# Patient Record
Sex: MALE | Race: WHITE
[De-identification: names, ages, dates, MRNs, and addresses within clinical notes are randomized per-mention and may not be internally consistent; named-entity substitution may affect disease eponyms.]

---

## 2018-04-20 ENCOUNTER — HOSPITAL ENCOUNTER (EMERGENCY)
Dept: HOSPITAL 45 - C.EDB | Age: 18
Discharge: HOME | End: 2018-04-20
Payer: COMMERCIAL

## 2018-04-20 VITALS — SYSTOLIC BLOOD PRESSURE: 130 MMHG | HEART RATE: 75 BPM | OXYGEN SATURATION: 100 % | DIASTOLIC BLOOD PRESSURE: 74 MMHG

## 2018-04-20 VITALS
WEIGHT: 190.48 LBS | HEIGHT: 70.51 IN | BODY MASS INDEX: 26.97 KG/M2 | WEIGHT: 190.48 LBS | BODY MASS INDEX: 26.97 KG/M2 | HEIGHT: 70.51 IN

## 2018-04-20 VITALS — TEMPERATURE: 97.88 F
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
## 2018-04-20 DIAGNOSIS — S01.81XA: Primary | ICD-10-CM

## 2018-04-21 NOTE — EMERGENCY ROOM VISIT NOTE
ED Visit Note


First contact with patient:  00:33


CHIEF COMPLAINT: Eyebrow laceration





HISTORY OF PRESENT ILLNESS: This 17-year-old male patient presents to the 

emergency department after cutting the left eyebrow after being struck by a 

hockey stick about 30 minutes ago. The bleeding has not stopped. Denies 

weakness or numbness of the face and jaw.  No visual changes or pain of the eye 

itself. The patient rates the pain as dull and 2/10. The patient denies any 

other injuries. The patient's Tetanus shot is reportedly up to date.





REVIEW OF SYSTEMS: A 6 system review of systems was completed with positives 

and pertinent negatives listed in the HPI. 





ALLERGIES: NKDA





MEDICATIONS: No chronic medication





PMH: Otherwise healthy 





SOCIAL HISTORY: Student who lives locally





PHYSICAL EXAM: Vital Signs: Reviewed Nurse's notes, vital signs stable. GENERAL

:  White male, in no acute distress, well-developed, well-nourished.  SKIN:  

There is a 1.5 cm X shaped laceration on the lateral aspect of the left 

eyebrow. The edges gape apart with traction. There is no foreign material in 

the wound and it looks clean. There is mild bleeding. No deep structures such 

as tendons, bones, or significant blood vessels are seen in the base of the 

wound.  Normal strength and movement of the face. Capillary refill less than 2 

seconds. Normal sensation to light and sharp touch.





EMERGENCY DEPARTMENT COURSE: I examined the patient.  Verbal consent was 

obtained to perform the procedure.  Using sterile technique the wound was 

cleansed with Betadine. The area was sterilely draped. 2 ml of 1% buffered 

lidocaine was used to anesthetize the laceration on the left eyebrow. Once the 

patient was anesthetized, the wound was copiously irrigated under pressure with 

sterile saline. The wound was explored and was as described above. The 

laceration was repaired using 4 simple interrupted 5-0 nylon sutures with the 

wound edges being well approximated. The patient tolerated the procedure well. 

Hemostasis was achieved. The area was cleaned with sterile saline and dressed 

with bacitracin ointment and bandage. The patient was discharged home in good 

condition.


Current/Historical Medications


No Active Prescriptions or Reported Meds





Allergies


Coded Allergies:  


     Benzoyl Peroxide (Verified  Allergy, Unknown, swelling, 4/20/18)





Vital Signs











  Date Time  Temp Pulse Resp B/P (MAP) Pulse Ox O2 Delivery O2 Flow Rate FiO2


 


4/20/18 02:02  75 16 130/74 100   


 


4/20/18 00:22 36.6 80 16 136/72 100 Room Air  











Departure Information


Impression





 Primary Impression:  


 Laceration of eyebrow





Dispostion


Home / Self-Care





Condition


GOOD





Prescriptions





No Active Prescriptions or Reported Meds





Forms


HOME CARE DOCUMENTATION FORM,                                                 

               IMPORTANT VISIT INFORMATION





Patient Instructions


[facility], ED Laceration All, ED Scar Tips to Minimize





Additional Instructions





Keep wound clean and dry.  


Do not allow any crusting or dried blood to accumulate on sutures.  


If this occurs, use a mild soap/water on a Q-tip to clean the wound.  


Do not use Peroxide to clean the wound as this can delay healing


Use an antibiotic ointment like Bacitracin for 3-4 days, then let wound dry.  


You may bathe and shower as normal, but DO NOT SOAK the wound. 


Suture removal in about 5-7 days with your Family Doctor or in the ER. 


Return sooner for any signs of infection, increasing redness, swelling, or 

drainage.

## 2023-12-20 ENCOUNTER — APPOINTMENT (OUTPATIENT)
Dept: URBAN - METROPOLITAN AREA CLINIC 198 | Age: 23
Setting detail: DERMATOLOGY
End: 2023-12-20

## 2023-12-20 DIAGNOSIS — D22 MELANOCYTIC NEVI: ICD-10-CM

## 2023-12-20 DIAGNOSIS — Z71.89 OTHER SPECIFIED COUNSELING: ICD-10-CM

## 2023-12-20 PROBLEM — D23.5 OTHER BENIGN NEOPLASM OF SKIN OF TRUNK: Status: ACTIVE | Noted: 2023-12-20

## 2023-12-20 PROBLEM — D22.5 MELANOCYTIC NEVI OF TRUNK: Status: ACTIVE | Noted: 2023-12-20

## 2023-12-20 PROCEDURE — OTHER DEFER: OTHER

## 2023-12-20 PROCEDURE — OTHER SUNSCREEN RECOMMENDATIONS: OTHER

## 2023-12-20 PROCEDURE — 99203 OFFICE O/P NEW LOW 30 MIN: CPT

## 2023-12-20 PROCEDURE — OTHER COUNSELING: OTHER

## 2023-12-20 ASSESSMENT — LOCATION ZONE DERM: LOCATION ZONE: TRUNK

## 2023-12-20 ASSESSMENT — LOCATION SIMPLE DESCRIPTION DERM
LOCATION SIMPLE: RIGHT UPPER BACK
LOCATION SIMPLE: LEFT UPPER BACK
LOCATION SIMPLE: ABDOMEN

## 2023-12-20 ASSESSMENT — LOCATION DETAILED DESCRIPTION DERM
LOCATION DETAILED: LEFT SUPERIOR MEDIAL UPPER BACK
LOCATION DETAILED: PERIUMBILICAL SKIN
LOCATION DETAILED: RIGHT SUPERIOR LATERAL UPPER BACK

## 2024-05-07 ENCOUNTER — APPOINTMENT (OUTPATIENT)
Dept: URBAN - METROPOLITAN AREA CLINIC 198 | Age: 24
Setting detail: DERMATOLOGY
End: 2024-05-07

## 2024-05-07 DIAGNOSIS — D485 NEOPLASM OF UNCERTAIN BEHAVIOR OF SKIN: ICD-10-CM

## 2024-05-07 PROBLEM — D48.5 NEOPLASM OF UNCERTAIN BEHAVIOR OF SKIN: Status: ACTIVE | Noted: 2024-05-07

## 2024-05-07 PROCEDURE — 11102 TANGNTL BX SKIN SINGLE LES: CPT

## 2024-05-07 PROCEDURE — OTHER BIOPSY BY SHAVE METHOD: OTHER

## 2024-05-07 ASSESSMENT — LOCATION SIMPLE DESCRIPTION DERM: LOCATION SIMPLE: RIGHT UPPER BACK

## 2024-05-07 ASSESSMENT — LOCATION ZONE DERM: LOCATION ZONE: TRUNK

## 2024-05-07 ASSESSMENT — LOCATION DETAILED DESCRIPTION DERM: LOCATION DETAILED: RIGHT SUPERIOR UPPER BACK

## 2024-05-07 NOTE — PROCEDURE: BIOPSY BY SHAVE METHOD
